# Patient Record
Sex: FEMALE | Race: BLACK OR AFRICAN AMERICAN
[De-identification: names, ages, dates, MRNs, and addresses within clinical notes are randomized per-mention and may not be internally consistent; named-entity substitution may affect disease eponyms.]

---

## 2014-09-08 VITALS
SYSTOLIC BLOOD PRESSURE: 120 MMHG | SYSTOLIC BLOOD PRESSURE: 120 MMHG | SYSTOLIC BLOOD PRESSURE: 120 MMHG | DIASTOLIC BLOOD PRESSURE: 78 MMHG | DIASTOLIC BLOOD PRESSURE: 78 MMHG | DIASTOLIC BLOOD PRESSURE: 78 MMHG

## 2017-05-10 ENCOUNTER — HOSPITAL ENCOUNTER (OUTPATIENT)
Dept: HOSPITAL 44 - LAB | Age: 67
End: 2017-05-10
Attending: FAMILY MEDICINE
Payer: COMMERCIAL

## 2017-05-10 DIAGNOSIS — I10: Primary | ICD-10-CM

## 2017-05-10 LAB
EGFR (AFRICAN): > 60
EGFR (NON-AFRICAN): > 60

## 2017-05-10 PROCEDURE — 36415 COLL VENOUS BLD VENIPUNCTURE: CPT

## 2017-05-10 PROCEDURE — 80053 COMPREHEN METABOLIC PANEL: CPT

## 2017-05-10 PROCEDURE — 80061 LIPID PANEL: CPT

## 2018-06-14 ENCOUNTER — HOSPITAL ENCOUNTER (OUTPATIENT)
Dept: HOSPITAL 44 - LAB | Age: 68
End: 2018-06-14
Attending: PHYSICIAN ASSISTANT
Payer: COMMERCIAL

## 2018-06-14 DIAGNOSIS — Z13.6: ICD-10-CM

## 2018-06-14 DIAGNOSIS — I10: Primary | ICD-10-CM

## 2018-06-14 DIAGNOSIS — R00.0: ICD-10-CM

## 2018-06-14 LAB — EGFR (NON-AFRICAN): > 60

## 2018-06-14 PROCEDURE — 84481 FREE ASSAY (FT-3): CPT

## 2018-06-14 PROCEDURE — 84439 ASSAY OF FREE THYROXINE: CPT

## 2018-06-14 PROCEDURE — 84443 ASSAY THYROID STIM HORMONE: CPT

## 2018-06-14 PROCEDURE — 36415 COLL VENOUS BLD VENIPUNCTURE: CPT

## 2018-06-14 PROCEDURE — 80053 COMPREHEN METABOLIC PANEL: CPT

## 2018-06-14 PROCEDURE — 80061 LIPID PANEL: CPT

## 2019-07-18 ENCOUNTER — HOSPITAL ENCOUNTER (OUTPATIENT)
Dept: HOSPITAL 44 - RAD | Age: 69
End: 2019-07-18
Attending: FAMILY MEDICINE
Payer: COMMERCIAL

## 2019-07-18 DIAGNOSIS — I10: Primary | ICD-10-CM

## 2019-07-18 LAB
EGFR (NON-AFRICAN): > 60
HDL: 56 MG/DL (ref 40–?)

## 2019-07-18 PROCEDURE — 36415 COLL VENOUS BLD VENIPUNCTURE: CPT

## 2019-07-18 PROCEDURE — 77080 DXA BONE DENSITY AXIAL: CPT

## 2019-07-18 PROCEDURE — 80061 LIPID PANEL: CPT

## 2019-07-18 PROCEDURE — 80053 COMPREHEN METABOLIC PANEL: CPT

## 2019-07-18 NOTE — DIAGNOSTIC IMAGING REPORT
NATALIIA QUINTEROS 



[facility]



[address] [address] [address]



 



 



 



 



Report Submission Date: 2019 12:23:30 PM CDT



Patient   Study 

Name: ARABELLA GALICIA   Date: 2019 12:00:00 AM CDT 

MRN: [id number]   Modality Type: DEXA\OT 

Gender: F   Description: DEXA 

: 48   Institution: [facility] 

Physician: NATALIIA QUINTEROS

    Accession:  [id number] 



 



 





Examination: Bone density 



History: Assess bone mineralization 



Comparison exams: None available 



Technique: DEXA protocol 



Findings: Average bone mineral density from L1 through L4: 1.120 grams cm2. T 
score: -0.5 



Average bone mineral density of the left femoral neck: 0.857 grams cm2. T score:
-1.3 



Average bone mineral density of the right femoral neck: 0.866 grams cm2. T 
score: -1.2 



Impression: Normal lumbar spine mineralization for age 



Wall of bilateral femoral neck osteopenia.



 





Electronically signed on 2019 12:23:30 PM CDT by:



Esteban LERNER